# Patient Record
Sex: MALE | URBAN - METROPOLITAN AREA
[De-identification: names, ages, dates, MRNs, and addresses within clinical notes are randomized per-mention and may not be internally consistent; named-entity substitution may affect disease eponyms.]

---

## 2019-04-08 ENCOUNTER — TELEPHONE (OUTPATIENT)
Dept: FAMILY MEDICINE CLINIC | Facility: CLINIC | Age: 84
End: 2019-04-08

## 2019-04-08 NOTE — TELEPHONE ENCOUNTER
Family wants to know if Dr El Maciel could take Siva Juares as his patient. Patient is currently at [facility] for a short time. Bola wanted  to have [employer] number [phone] and phone number [phone]. Bola would like a call back.

## 2019-04-08 NOTE — TELEPHONE ENCOUNTER
Patient is living in a assistant living facility. The family wanted him to see Dr Vel Serna. Nita Madera will be faxing over a summary of the patient and family will be calling to set up appt. No other questions at this time.

## 2022-07-09 ENCOUNTER — TELEPHONE ENCOUNTER (OUTPATIENT)
Dept: URBAN - METROPOLITAN AREA CLINIC 121 | Facility: CLINIC | Age: 87
End: 2022-07-09

## 2022-07-10 ENCOUNTER — TELEPHONE ENCOUNTER (OUTPATIENT)
Dept: URBAN - METROPOLITAN AREA CLINIC 121 | Facility: CLINIC | Age: 87
End: 2022-07-10